# Patient Record
Sex: FEMALE | Race: WHITE | NOT HISPANIC OR LATINO | Employment: UNEMPLOYED | URBAN - METROPOLITAN AREA
[De-identification: names, ages, dates, MRNs, and addresses within clinical notes are randomized per-mention and may not be internally consistent; named-entity substitution may affect disease eponyms.]

---

## 2019-03-25 LAB — MAMMOGRAM: NORMAL

## 2019-05-07 ENCOUNTER — TRANSFERRED RECORDS (OUTPATIENT)
Dept: HEALTH INFORMATION MANAGEMENT | Facility: CLINIC | Age: 59
End: 2019-05-07

## 2020-08-13 ENCOUNTER — TRANSFERRED RECORDS (OUTPATIENT)
Dept: HEALTH INFORMATION MANAGEMENT | Facility: CLINIC | Age: 60
End: 2020-08-13

## 2020-08-25 ENCOUNTER — MEDICAL CORRESPONDENCE (OUTPATIENT)
Dept: HEALTH INFORMATION MANAGEMENT | Facility: CLINIC | Age: 60
End: 2020-08-25

## 2020-10-10 NOTE — TELEPHONE ENCOUNTER
FUTURE VISIT INFORMATION      FUTURE VISIT INFORMATION:    Date: 10.28.20    Time: 10:30    Location: [facility]  REFERRAL INFORMATION:    Referring provider:  MARITZA Courtney    Referring providers clinic:  [facility]    Reason for visit/diagnosis  new Pt referred by Dr Louisa Charles for alopecia.  Referral is in transit.  requested RECs be faxed to [facility].    RECORDS REQUESTED FROM:       Clinic name Comments Records Status Imaging Status   [facility] 9.22.20, 8.13.20, 11.5.19  Louisa Charles [facility] N/A

## 2020-10-15 ENCOUNTER — DOCUMENTATION ONLY (OUTPATIENT)
Dept: CARE COORDINATION | Facility: CLINIC | Age: 60
End: 2020-10-15

## 2020-10-28 ENCOUNTER — PRE VISIT (OUTPATIENT)
Dept: DERMATOLOGY | Facility: CLINIC | Age: 60
End: 2020-10-28

## 2020-10-28 ENCOUNTER — OFFICE VISIT (OUTPATIENT)
Dept: DERMATOLOGY | Facility: CLINIC | Age: 60
End: 2020-10-28
Payer: COMMERCIAL

## 2020-10-28 DIAGNOSIS — L66.12 FRONTAL FIBROSING ALOPECIA: Primary | ICD-10-CM

## 2020-10-28 PROCEDURE — 11901 INJECT SKIN LESIONS >7: CPT | Performed by: DERMATOLOGY

## 2020-10-28 PROCEDURE — 99202 OFFICE O/P NEW SF 15 MIN: CPT | Performed by: DERMATOLOGY

## 2020-10-28 RX ORDER — FLUTICASONE PROPIONATE 50 MCG
1-2 SPRAY, SUSPENSION (ML) NASAL
COMMUNITY

## 2020-10-28 RX ORDER — AMOXICILLIN 250 MG
1-2 CAPSULE ORAL
COMMUNITY
Start: 2019-10-03 | End: 2021-02-03

## 2020-10-28 RX ORDER — GABAPENTIN 100 MG/1
CAPSULE ORAL
COMMUNITY
Start: 2020-07-03

## 2020-10-28 RX ORDER — PYRIDOXINE HCL (VITAMIN B6) 100 MG
500 TABLET ORAL
COMMUNITY
Start: 2019-03-01

## 2020-10-28 RX ORDER — POLYETHYLENE GLYCOL 3350 17 G/17G
POWDER, FOR SOLUTION ORAL
COMMUNITY
Start: 2019-10-03

## 2020-10-28 RX ORDER — CLOBETASOL PROPIONATE 0.5 MG/ML
SOLUTION TOPICAL
COMMUNITY
Start: 2020-08-13

## 2020-10-28 RX ORDER — DOXYCYCLINE 100 MG/1
100 CAPSULE ORAL 2 TIMES DAILY
Qty: 60 CAPSULE | Refills: 3 | Status: SHIPPED | OUTPATIENT
Start: 2020-10-28 | End: 2021-02-17

## 2020-10-28 RX ORDER — TOLTERODINE TARTRATE 1 MG/1
1 TABLET, EXTENDED RELEASE ORAL
COMMUNITY
Start: 2020-10-26

## 2020-10-28 RX ORDER — RISEDRONATE SODIUM 35 MG/1
35 TABLET, FILM COATED ORAL
COMMUNITY
Start: 2019-06-03

## 2020-10-28 ASSESSMENT — PAIN SCALES - GENERAL: PAINLEVEL: NO PAIN (0)

## 2020-10-28 NOTE — PATIENT INSTRUCTIONS
Frontal fibrosing alopecia  Autoimmune condition  Scarring hair loss - we try to prevent progression  Treatment can be tough, everyone responds differently.    Options:  - Topical or injection steroids  - Doxycycline (antibiotic)  - Hydroxychloroquine   - Finasteride (anti-male hormone)    Start doxycycline 100 mg twice toni. Side effects are stomach upset and increased sun sensitivity (higher chance of developing a sunburn while in the sun). Okay to take medication with food and please wear sunscreen.

## 2020-10-28 NOTE — PROGRESS NOTES
[facility] Dermatology Note      Dermatology Problem List:  1. Frontal fibrosing alopecia (clinically diagnosed).  - Current rx: ILK, clobetasol solution, started doxycycline 10/28/2020   - Future: hydroxychloroquine, finasteride    CC:   Chief Complaint   Patient presents with     Hair Loss     Mariana is here today to be seen for Alopecia- Referred by Louisa Charles.          Encounter Date: Oct 28, 2020    History of Present Illness:  Ms. Mariana Flor is a 60 year old female who presents as a referral from Louisa Charles for evaluation of hair loss.  Patient first noted that her eyebrows are becoming thin last fall.  She tried clobetasol solution without improvement.  Her care was then disrupted due to Covid and she did not return until the summer.  At that time, she noted decrease in hair loss diffusely but especially along the frontal hairline.  She has lost at least 50% of her density.  Eyebrows intact.  Has not had to shave armpits in years.  Decreased hair growth on arms and legs.  Her scalp is itchy but no burning or pain.  She uses head and shoulders shampoo.  She had ILK x1 in August without any improvement.    She has a history of pulmonary embolism on anticoagulation and neck/back issues. She gets headaches daily. No personal or family history of autoimmune disease.  Dad and brothers with thinning hair but mom and sisters with full hair.    She works as a banker.     Past Medical History:   There is no problem list on file for this patient.    Past Medical History:   Diagnosis Date     History of pulmonary embolism      History reviewed. No pertinent surgical history.    Social History:  Patient reports that she has never smoked. She has never used smokeless tobacco.   Lives in [place], [place].    Family History:  Family History   Problem Relation Age of Onset     Autoimmune Disease No family hx of        Medications:  Current Outpatient Medications   Medication Sig Dispense Refill     Black  Pepper-Turmeric 3-500 MG CAPS Take 1 capsule by mouth       clobetasol (TEMOVATE) 0.05 % external solution        Cranberry 500 MG CAPS Take 500 mg by mouth       doxycycline monohydrate (MONODOX) 100 MG capsule Take 1 capsule (100 mg) by mouth 2 times daily 60 capsule 3     fluticasone (FLONASE) 50 MCG/ACT nasal spray Spray 1-2 sprays in nostril       gabapentin (NEURONTIN) 100 MG capsule        polyethylene glycol (MIRALAX) 17 GM/Dose powder        RISEdronate (ACTONEL) 35 MG tablet Take 35 mg by mouth       rivaroxaban ANTICOAGULANT (XARELTO) 20 MG TABS tablet Take 20 mg by mouth       senna-docusate (SENOKOT-S/PERICOLACE) 8.6-50 MG tablet Take 1-2 tablets by mouth       tolterodine (DETROL) 1 MG tablet Take 1 mg by mouth       Allergies   Allergen Reactions     Latex Rash     Alendronic Acid Rash     Cepacol Maximum Strength Rash     Nickel Rash         Review of Systems:  -Constitutional: Otherwise feeling well today, in usual state of health.  -Skin: As above in HPI. No additional skin concerns.    Physical exam:  GEN: This is a well developed, well-nourished female in no acute distress, in a pleasant mood.    SKIN: Focused examination of the scalp and face was performed.  -Manriquez skin type: II  -frontal recession of hairline with scattered lone fibers. Perifollicular erythema present, minimal scale.  -few fibers along eyebrows.  -eyelashes normal density.  -No other lesions of concern on areas examined.     Labs:  N/a    Impression/Plan:  1. Frontal fibrosing alopecia (based on clinical impression). Natural history and etiology discussed. Offered biopsy to confirm but given exam which is fairly classic, did not feel entirely necessary today. Counseled on scarring nature of this condition and goal of stopping progression. Discussed treatment options including topical steroids (which she has already failed), ILK (which she has had x1 without improvement), and systemics such as doxycycline,  hydroxychloroquine, and finasteride. Today we will plan to resume ILK and give longer trial more in the 3 month range and also start trial of doxycycline. Pending response, consider hydroxychloroquine or finasteride. She will plan to continue seeing Louisa Charles for ILK as is it much more convenient for her and plan to follow-up with us in about 3 months again, sooner if concerns.   - Kenalog intralesional injection procedure note: After verbal consent and discussion of risks including but not limited to atrophy, pain, and bruising, time out was performed, the patient underwent positioning and the area was prepped with isopropyl alcohol, 0.7 total cc of Kenalog 10 mg/cc was injected into approximately 7 site(s) on the scalp. The patient tolerated the procedure well and left the Dermatology clinic in good condition.  - Start doxycycline 100 mg BID. Counseled on side effects of GI upset and sun sensitivity.  - Future: hydroxychloroquine, finasteride    Follow-up in 3 months, earlier for new or changing lesions.       Staff Involved:  Staff Only    Neli Hazel MD    Department of Dermatology  [facility]: Phone: [phone], Fax: [phone]  10/28/2020

## 2020-10-28 NOTE — LETTER
Date:October 30, 2020      Patient was self referred, no letter generated. Do not send.        [facility] Health Information

## 2020-10-28 NOTE — NURSING NOTE
Dermatology Rooming Note    Mariana Flor's goals for this visit include:   Chief Complaint   Patient presents with     Hair Loss     Mariana is here today to be seen for Alopecia- Referred by Louisa Charles.      AMY Veras

## 2020-10-28 NOTE — LETTER
10/28/2020       RE: Mariana Flor  [address] [address] [address]     Dear Colleague,    Thank you for referring your patient, Mariana Flor, to the [facility] [place] at [facility]. Please see a copy of my visit note below.    [facility] Dermatology Note      Dermatology Problem List:  1. Frontal fibrosing alopecia (clinically diagnosed).  - Current rx: ILK, clobetasol solution, started doxycycline 10/28/2020   - Future: hydroxychloroquine, finasteride    CC:   Chief Complaint   Patient presents with     Hair Loss     Mariana is here today to be seen for Alopecia- Referred by Louisa Charles.          Encounter Date: Oct 28, 2020    History of Present Illness:  Ms. Mariana Flor is a 60 year old female who presents as a referral from Louisa Charles for evaluation of hair loss.  Patient first noted that her eyebrows are becoming thin last fall.  She tried clobetasol solution without improvement.  Her care was then disrupted due to Covid and she did not return until the summer.  At that time, she noted decrease in hair loss diffusely but especially along the frontal hairline.  She has lost at least 50% of her density.  Eyebrows intact.  Has not had to shave armpits in years.  Decreased hair growth on arms and legs.  Her scalp is itchy but no burning or pain.  She uses head and shoulders shampoo.  She had ILK x1 in August without any improvement.    She has a history of pulmonary embolism on anticoagulation and neck/back issues. She gets headaches daily. No personal or family history of autoimmune disease.  Dad and brothers with thinning hair but mom and sisters with full hair.    She works as a banker.     Past Medical History:   There is no problem list on file for this patient.    Past Medical History:   Diagnosis Date     History of pulmonary embolism      History reviewed. No pertinent surgical history.    Social  History:  Patient reports that she has never smoked. She has never used smokeless tobacco.   Lives in [place], [place].    Family History:  Family History   Problem Relation Age of Onset     Autoimmune Disease No family hx of        Medications:  Current Outpatient Medications   Medication Sig Dispense Refill     Black Pepper-Turmeric 3-500 MG CAPS Take 1 capsule by mouth       clobetasol (TEMOVATE) 0.05 % external solution        Cranberry 500 MG CAPS Take 500 mg by mouth       doxycycline monohydrate (MONODOX) 100 MG capsule Take 1 capsule (100 mg) by mouth 2 times daily 60 capsule 3     fluticasone (FLONASE) 50 MCG/ACT nasal spray Spray 1-2 sprays in nostril       gabapentin (NEURONTIN) 100 MG capsule        polyethylene glycol (MIRALAX) 17 GM/Dose powder        RISEdronate (ACTONEL) 35 MG tablet Take 35 mg by mouth       rivaroxaban ANTICOAGULANT (XARELTO) 20 MG TABS tablet Take 20 mg by mouth       senna-docusate (SENOKOT-S/PERICOLACE) 8.6-50 MG tablet Take 1-2 tablets by mouth       tolterodine (DETROL) 1 MG tablet Take 1 mg by mouth       Allergies   Allergen Reactions     Latex Rash     Alendronic Acid Rash     Cepacol Maximum Strength Rash     Nickel Rash         Review of Systems:  -Constitutional: Otherwise feeling well today, in usual state of health.  -Skin: As above in HPI. No additional skin concerns.    Physical exam:  GEN: This is a well developed, well-nourished female in no acute distress, in a pleasant mood.    SKIN: Focused examination of the scalp and face was performed.  -Manriquez skin type: II  -frontal recession of hairline with scattered lone fibers. Perifollicular erythema present, minimal scale.  -few fibers along eyebrows.  -eyelashes normal density.  -No other lesions of concern on areas examined.     Labs:  N/a    Impression/Plan:  1. Frontal fibrosing alopecia (based on clinical impression). Natural history and etiology discussed. Offered biopsy to confirm but given exam which is fairly  classic, did not feel entirely necessary today. Counseled on scarring nature of this condition and goal of stopping progression. Discussed treatment options including topical steroids (which she has already failed), ILK (which she has had x1 without improvement), and systemics such as doxycycline, hydroxychloroquine, and finasteride. Today we will plan to resume ILK and give longer trial more in the 3 month range and also start trial of doxycycline. Pending response, consider hydroxychloroquine or finasteride. She will plan to continue seeing Louisa Charles for ILK as is it much more convenient for her and plan to follow-up with us in about 3 months again, sooner if concerns.   - Kenalog intralesional injection procedure note: After verbal consent and discussion of risks including but not limited to atrophy, pain, and bruising, time out was performed, the patient underwent positioning and the area was prepped with isopropyl alcohol, 0.7 total cc of Kenalog 10 mg/cc was injected into approximately 7 site(s) on the scalp. The patient tolerated the procedure well and left the Dermatology clinic in good condition.  - Start doxycycline 100 mg BID. Counseled on side effects of GI upset and sun sensitivity.  - Future: hydroxychloroquine, finasteride    Follow-up in 3 months, earlier for new or changing lesions.       Staff Involved:  Staff Only    Neli Hazel MD    Department of Dermatology  [facility]: Phone: [phone], Fax: [phone]  10/28/2020        Again, thank you for allowing me to participate in the care of your patient.      Sincerely,    Neli Hazel MD

## 2020-10-28 NOTE — NURSING NOTE
Drug Administration Record    Prior to injection, verified patient identity using patient's name and date of birth.  Due to injection administration, patient instructed to remain in clinic for 15 minutes  afterwards, and to report any adverse reaction to me immediately.    Drug Name: triamcinolone acetonide(kenalog)  Dose: 2mL of triamcinolone 10mg/mL, 20mg dose  Route administered: ID  NDC #: Kenalog-10 (4297-8350-00)  Amount of waste(mL):3  Reason for waste: Multi dose vial    LOT #: [id number]  SITE: body  : [employer]  EXPIRATION DATE: March 2022

## 2021-01-04 ENCOUNTER — HEALTH MAINTENANCE LETTER (OUTPATIENT)
Age: 61
End: 2021-01-04

## 2021-02-01 ENCOUNTER — VIRTUAL VISIT (OUTPATIENT)
Dept: DERMATOLOGY | Facility: CLINIC | Age: 61
End: 2021-02-01
Payer: COMMERCIAL

## 2021-02-01 DIAGNOSIS — L66.12 FRONTAL FIBROSING ALOPECIA: Primary | ICD-10-CM

## 2021-02-01 PROCEDURE — 99213 OFFICE O/P EST LOW 20 MIN: CPT | Mod: 95 | Performed by: PHYSICIAN ASSISTANT

## 2021-02-01 RX ORDER — TACROLIMUS 1 MG/G
OINTMENT TOPICAL 2 TIMES DAILY
Qty: 60 G | Refills: 3 | Status: SHIPPED | OUTPATIENT
Start: 2021-02-01

## 2021-02-01 ASSESSMENT — PAIN SCALES - GENERAL: PAINLEVEL: NO PAIN (0)

## 2021-02-01 NOTE — PATIENT INSTRUCTIONS
Continue doxycyline 100 mg twice daily.  Use clobetasol solution once daily on the scalp.  Start Protopic 0.1% ointment twice daily on the eyebrows.     Okay to continue kenalog injections, but avoid areas of atrophy.   Do not use Kenalog 10 mg/ml on the face, Okay to use 5 mg/ml or 2.5 mg/ml. Again avoid areas of atrophy.     -Avoid using any topical steroids (clobetasol) within a week of injections

## 2021-02-01 NOTE — LETTER
2/1/2021       RE: Mar[place]  [address] [address] [address]     Dear Colleague,    Thank you for referring your patient, Mariana Flor, to the [facility] [place] at [facility]. Please see a copy of my visit note below.    [facility] Dermatology Note  Encounter Date: Feb 1, 2021  Store-and-Forward and Video, Amwell video chat. Location of teledermatologist: [facility] [place]. Date of images: 02/01/21. Start time: 10:38. End time:10:50    Dermatology Problem List:  1. Frontal fibrosing alopecia (clinically diagnosed).  - Current rx: ILK, clobetasol solution, started doxycycline 10/28/2020   -Protopic eyebrows  - Future: hydroxychloroquine, finasteride  ____________________________________________    Assessment & Plan:     # Frontal fibrosis alopecia,  Continue doxycyline 100 mg twice daily. (reviewed possible side effects)  Use clobetasol solution once daily on the scalp.  Start Protopic 0.1% ointment twice daily on the eyebrows. (discussed possible irritation/ stinging)     Okay to continue kenalog injections, but avoid areas of atrophy.   Do not use Kenalog 10 mg/ml on the face, Okay to use 5 mg/ml or 2.5 mg/ml. Again avoid areas of atrophy.     -Avoid using any topical steroids (clobetasol) within a week of injections          Procedures Performed:    None    Follow-up: 3 month(s) virtually (video), or earlier for new or changing lesions    Staff:     All risks, benefits and alternatives were discussed with patient.  Patient is in agreement and understands the assessment and plan.  All questions were answered.  Return to Clinic in 3 months or sooner as needed.   Tabatha Hernandez PA-C   ____________________________________________    CC: Hair Loss (mariana is having this visit today for a follow up on hair loss)    HPI:  Ms. Mariana Flor is a(n) 60 year old female who presents today as a  return patient for follow up of frontal fibrosing alopecia. She was last seen by Dr Velvet Hazel in October 2020 when she had intralesional kenalog to a few areas on the scalp and was started on doxycycline 100 mg bid. She states since then her hair has not been falling as much. She is unsure if she has noticed any growth. She denies any symptoms to the scalp such as scalp pain, itching or burning. She has been seeing Louisa Charles for ILK on her scalp and eyebrows. She has not noticed any growth on her eyebrows but did notice an divot in the skin after the last set of injections. She applies clobetasol to her eyebrows.     The patient denies any significant or change in headaches. No GI upset or sun burn. No diarrhea or bloody stools.     Patient is otherwise feeling well, without additional concerns.        Labs:  NA    Physical Exam:  Vitals: There were no vitals taken for this visit.  SKIN: Teledermatology photos were reviewed; image quality and interpretability: Acceptable. Image date: 1/28/21  - There are prominent temporal veins. No obvious terminal eyebrow hairs. Normal appearing finger nail plates.   Recession of the frontal hair line.   - No other lesions of concern on areas examined.     Medications:  Current Outpatient Medications   Medication     Black Pepper-Turmeric 3-500 MG CAPS     clobetasol (TEMOVATE) 0.05 % external solution     Cranberry 500 MG CAPS     doxycycline monohydrate (MONODOX) 100 MG capsule     fluticasone (FLONASE) 50 MCG/ACT nasal spray     gabapentin (NEURONTIN) 100 MG capsule     polyethylene glycol (MIRALAX) 17 GM/Dose powder     RISEdronate (ACTONEL) 35 MG tablet     rivaroxaban ANTICOAGULANT (XARELTO) 20 MG TABS tablet     tolterodine (DETROL) 1 MG tablet     senna-docusate (SENOKOT-S/PERICOLACE) 8.6-50 MG tablet     Current Facility-Administered Medications   Medication     triamcinolone acetonide (KENALOG-10) injection 7 mg      Past Medical/Surgical History:   There  is no problem list on file for this patient.    Past Medical History:   Diagnosis Date     History of pulmonary embolism        CC Neli Hazel MD   DERMATOLOGY  [address] [place] [address] on close of this encounter.                                          Again, thank you for allowing me to participate in the care of your patient.      Sincerely,    Tabatha Hernandez PA-C

## 2021-02-01 NOTE — NURSING NOTE
Dermatology Rooming Note    Mariana Flor's goals for this visit include:   Chief Complaint   Patient presents with     Hair Loss     mariana is having this visit today for a follow up on hair loss     Kenyatta Kraus CMA on 2/1/2021 at 10:16 AM

## 2021-02-01 NOTE — PROGRESS NOTES
[facility] Dermatology Note  Encounter Date: Feb 1, 2021  Store-and-Forward and Video, Armando video chat. Location of teledermatologist: [facility] [place]. Date of images: 02/01/21. Start time: 10:38. End time:10:50    Dermatology Problem List:  1. Frontal fibrosing alopecia (clinically diagnosed).  - Current rx: ILK, clobetasol solution, started doxycycline 10/28/2020   -Protopic eyebrows  - Future: hydroxychloroquine, finasteride  ____________________________________________    Assessment & Plan:     # Frontal fibrosis alopecia,  Continue doxycyline 100 mg twice daily. (reviewed possible side effects)  Use clobetasol solution once daily on the scalp.  Start Protopic 0.1% ointment twice daily on the eyebrows. (discussed possible irritation/ stinging)     Okay to continue kenalog injections, but avoid areas of atrophy.   Do not use Kenalog 10 mg/ml on the face, Okay to use 5 mg/ml or 2.5 mg/ml. Again avoid areas of atrophy.     -Avoid using any topical steroids (clobetasol) within a week of injections          Procedures Performed:    None    Follow-up: 3 month(s) virtually (video), or earlier for new or changing lesions    Staff:     All risks, benefits and alternatives were discussed with patient.  Patient is in agreement and understands the assessment and plan.  All questions were answered.  Return to Clinic in 3 months or sooner as needed.   Tabatha Hernandez PA-C   ____________________________________________    CC: Hair Loss (mariana is having this visit today for a follow up on hair loss)    HPI:  Ms. Mariana Flor is a(n) 60 year old female who presents today as a return patient for follow up of frontal fibrosing alopecia. She was last seen by Dr Velvet Hazel in October 2020 when she had intralesional kenalog to a few areas on the scalp and was started on doxycycline 100 mg bid. She states since then her hair has not been falling as much. She is unsure if  she has noticed any growth. She denies any symptoms to the scalp such as scalp pain, itching or burning. She has been seeing Louisa Charles for ILK on her scalp and eyebrows. She has not noticed any growth on her eyebrows but did notice an divot in the skin after the last set of injections. She applies clobetasol to her eyebrows.     The patient denies any significant or change in headaches. No GI upset or sun burn. No diarrhea or bloody stools.     Patient is otherwise feeling well, without additional concerns.        Labs:  NA    Physical Exam:  Vitals: There were no vitals taken for this visit.  SKIN: Teledermatology photos were reviewed; image quality and interpretability: Acceptable. Image date: 1/28/21  - There are prominent temporal veins. No obvious terminal eyebrow hairs. Normal appearing finger nail plates.   Recession of the frontal hair line.   - No other lesions of concern on areas examined.     Medications:  Current Outpatient Medications   Medication     Black Pepper-Turmeric 3-500 MG CAPS     clobetasol (TEMOVATE) 0.05 % external solution     Cranberry 500 MG CAPS     doxycycline monohydrate (MONODOX) 100 MG capsule     fluticasone (FLONASE) 50 MCG/ACT nasal spray     gabapentin (NEURONTIN) 100 MG capsule     polyethylene glycol (MIRALAX) 17 GM/Dose powder     RISEdronate (ACTONEL) 35 MG tablet     rivaroxaban ANTICOAGULANT (XARELTO) 20 MG TABS tablet     tolterodine (DETROL) 1 MG tablet     senna-docusate (SENOKOT-S/PERICOLACE) 8.6-50 MG tablet     Current Facility-Administered Medications   Medication     triamcinolone acetonide (KENALOG-10) injection 7 mg      Past Medical/Surgical History:   There is no problem list on file for this patient.    Past Medical History:   Diagnosis Date     History of pulmonary embolism        CC Neli Hazel MD   DERMATOLOGY  [address] [facility] [address] [address] [address] on close of this encounter.

## 2021-02-16 DIAGNOSIS — L66.12 FRONTAL FIBROSING ALOPECIA: ICD-10-CM

## 2021-02-17 RX ORDER — DOXYCYCLINE 100 MG/1
100 CAPSULE ORAL 2 TIMES DAILY
Qty: 60 CAPSULE | Refills: 2 | Status: SHIPPED | OUTPATIENT
Start: 2021-02-17 | End: 2021-05-07

## 2021-02-17 NOTE — TELEPHONE ENCOUNTER
doxycycline monohydrate (MONODOX) 100 MG capsule      Last Written Prescription Date:  10-28-20  Last Fill Quantity: 60,   # refills: 3  Last Office Visit : 2-1-21  Future Office visit:  5-7-21    Routing refill request to provider for review/approval because:  Associated diagnosis not on protocol     Frontal fibrosing alopecia

## 2021-02-17 NOTE — TELEPHONE ENCOUNTER
Received refill request for doxycycline as MINGO. Last dermatology clinic note reviewed. Plan was to continue this medication per last clinic note. Limited refill provided to bridge to next clinic visit.     Next clinic visit scheduled for 5/7/21.    Tabatha Hernandez PA-C CC'd as JOSHUA.     Louisa Son MD  Dermatology Resident

## 2021-05-07 ENCOUNTER — VIRTUAL VISIT (OUTPATIENT)
Dept: DERMATOLOGY | Facility: CLINIC | Age: 61
End: 2021-05-07
Payer: COMMERCIAL

## 2021-05-07 DIAGNOSIS — L66.12 FRONTAL FIBROSING ALOPECIA: Primary | ICD-10-CM

## 2021-05-07 DIAGNOSIS — Z51.81 MEDICATION MONITORING ENCOUNTER: ICD-10-CM

## 2021-05-07 PROCEDURE — 99214 OFFICE O/P EST MOD 30 MIN: CPT | Mod: 95 | Performed by: DERMATOLOGY

## 2021-05-07 RX ORDER — HYDROXYCHLOROQUINE SULFATE 200 MG/1
TABLET, FILM COATED ORAL
Qty: 90 TABLET | Refills: 2 | Status: SHIPPED | OUTPATIENT
Start: 2021-05-07

## 2021-05-07 ASSESSMENT — PAIN SCALES - GENERAL: PAINLEVEL: NO PAIN (0)

## 2021-05-07 NOTE — PROGRESS NOTES
[facility] Dermatology Note  Encounter Date: May 7, 2021  Store-and-Forward and Telephone ([phone] ). Location of teledermatologist: [facility] [place].  Start time: 1:01 pm. End time: 1:19 PM.    Dermatology Problem List:  1. Frontal fibrosing alopecia (clinically diagnosed).  - Current rx: ILK, clobetasol solution, Protopic to eyebrows  - Prior rx: doxycycline (10/28/2020-May 2021)  - Future: hydroxychloroquine, finasteride    ___________________________________________    Assessment & Plan:     # Frontal fibrosing alopecia, not improving on current regimen. Discussed alternative therapies including hydroxychloroquine and finasteride, she is interested in hydroxychloroquine.  - patient weights 58.9 kg --> at 5 mg/kg dosing, dose should be 294 mg daily --> will do hydroxychloroquine 400 mg daily a/w 200 mg daily dosing  - stop doxycycline as not effective  - continue clobetasol solution daily prn on the scalp  - continue Protopic BID prn eyebrows  - okay to continue ILK if desired   - also provided information on minoxidil 5% foam  After visit, patient sent [email or web address] message that she does not wish to start hydroxychloroquine. Advised to notify me if interested in it at any time, or re-discussion of other options.    # Lab monitoring for Plaquenil:  - needs baseline eye exam  - Hepatic panel wnl 12/16/2020  - CBC wnl 4/8/2021    Procedures Performed:    None    Follow-up: 3 months, sooner if concerns.     Staff:     Neli Hazel MD    Department of Dermatology  [facility]: Phone: [phone], Fax: [phone]  5/7/2021    ____________________________________________    CC: Hair Loss (adryan is having this visit today for a follow up on hair loss)    HPI:  Ms. Adryan Flor is a(n) 61 year old female who presents today as a return patient for FFA.    Not getting any  better.  Shedding all over, not just the front.  Really in the past month the shedding started.    Always had really bad headaches and pains.  Had a procedure on March 15th but the head pain has been for quite some time.  Was in the hospital in the fall with stomach problems.    She is itching more on scalp.  Still taking doxycycline 100 mg twice daily, almost out of it.  Using protopic for eyebrows, clobetasol along hairline.  Stopped injections about 2 months ago.  Hairline progressing backwards.    Patient is otherwise feeling well, without additional skin concerns.    Labs Reviewed:  See a/p    Physical Exam:  Vitals: There were no vitals taken for this visit.  SKIN: Teledermatology photos were reviewed; image quality and interpretability:   acceptable. Image date: see chart.  - regression of frontal hairline, unable to see more detailed exam.  - No other lesions of concern on areas examined.     Medications:  Current Outpatient Medications   Medication     Black Pepper-Turmeric 3-500 MG CAPS     clobetasol (TEMOVATE) 0.05 % external solution     Cranberry 500 MG CAPS     doxycycline monohydrate (MONODOX) 100 MG capsule     fluticasone (FLONASE) 50 MCG/ACT nasal spray     gabapentin (NEURONTIN) 100 MG capsule     polyethylene glycol (MIRALAX) 17 GM/Dose powder     RISEdronate (ACTONEL) 35 MG tablet     rivaroxaban ANTICOAGULANT (XARELTO) 20 MG TABS tablet     tacrolimus (PROTOPIC) 0.1 % external ointment     tolterodine (DETROL) 1 MG tablet     Current Facility-Administered Medications   Medication     triamcinolone acetonide (KENALOG-10) injection 7 mg      Past Medical/Surgical History:   There is no problem list on file for this patient.    Past Medical History:   Diagnosis Date     History of pulmonary embolism        CC Referred Self, MD  No address on file on close of this encounter.

## 2021-05-07 NOTE — LETTER
5/7/2021       RE: Mariana Flor  [id number] [facility] [address]     Dear Colleague,    Thank you for referring your patient, Mariana Flor, to the [facility] [place] [facility]. Please see a copy of my visit note below.    [facility] Dermatology Note  Encounter Date: May 7, 2021  Store-and-Forward and Telephone ([phone] ). Location of teledermatologist: [facility] [place].  Start time: 1:01 pm. End time: 1:19 PM.    Dermatology Problem List:  1. Frontal fibrosing alopecia (clinically diagnosed).  - Current rx: ILK, clobetasol solution, Protopic to eyebrows  - Prior rx: doxycycline (10/28/2020-May 2021)  - Future: hydroxychloroquine, finasteride    ___________________________________________    Assessment & Plan:     # Frontal fibrosing alopecia, not improving on current regimen. Discussed alternative therapies including hydroxychloroquine and finasteride, she is interested in hydroxychloroquine.  - patient weights 58.9 kg --> at 5 mg/kg dosing, dose should be 294 mg daily --> will do hydroxychloroquine 400 mg daily a/w 200 mg daily dosing  - stop doxycycline as not effective  - continue clobetasol solution daily prn on the scalp  - continue Protopic BID prn eyebrows  - okay to continue ILK if desired   - also provided information on minoxidil 5% foam  After visit, patient sent [employer] message that she does not wish to start hydroxychloroquine. Advised to notify me if interested in it at any time, or re-discussion of other options.    # Lab monitoring for Plaquenil:  - needs baseline eye exam  - Hepatic panel wnl 12/16/2020  - CBC wnl 4/8/2021    Procedures Performed:    None    Follow-up: 3 months, sooner if concerns.     Staff:     Neli Hazel MD    Department of Dermatology  [facility]: Phone: [phone], Fax: [phone]  5/7/2021    ____________________________________________    CC: Hair Loss (mariana is having this visit today for a follow up on hair loss)    HPI:  Ms. Mariana Flor is a(n) 61 year old female who presents today as a return patient for FFA.    Not getting any better.  Shedding all over, not just the front.  Really in the past month the shedding started.    Always had really bad headaches and pains.  Had a procedure on March 15th but the head pain has been for quite some time.  Was in the hospital in the fall with stomach problems.    She is itching more on scalp.  Still taking doxycycline 100 mg twice daily, almost out of it.  Using protopic for eyebrows, clobetasol along hairline.  Stopped injections about 2 months ago.  Hairline progressing backwards.    Patient is otherwise feeling well, without additional skin concerns.    Labs Reviewed:  See a/p    Physical Exam:  Vitals: There were no vitals taken for this visit.  SKIN: Teledermatology photos were reviewed; image quality and interpretability:   acceptable. Image date: see chart.  - regression of frontal hairline, unable to see more detailed exam.  - No other lesions of concern on areas examined.     Medications:  Current Outpatient Medications   Medication     Black Pepper-Turmeric 3-500 MG CAPS     clobetasol (TEMOVATE) 0.05 % external solution     Cranberry 500 MG CAPS     doxycycline monohydrate (MONODOX) 100 MG capsule     fluticasone (FLONASE) 50 MCG/ACT nasal spray     gabapentin (NEURONTIN) 100 MG capsule     polyethylene glycol (MIRALAX) 17 GM/Dose powder     RISEdronate (ACTONEL) 35 MG tablet     rivaroxaban ANTICOAGULANT (XARELTO) 20 MG TABS tablet     tacrolimus (PROTOPIC) 0.1 % external ointment     tolterodine (DETROL) 1 MG tablet     Current Facility-Administered Medications   Medication     triamcinolone acetonide (KENALOG-10) injection 7 mg      Past Medical/Surgical History:   There is no  problem list on file for this patient.    Past Medical History:   Diagnosis Date     History of pulmonary embolism        CC Referred Self, MD  No address on file on close of this encounter.

## 2021-05-07 NOTE — NURSING NOTE
Dermatology Rooming Note    Mariana Flor's goals for this visit include:   Chief Complaint   Patient presents with     Hair Loss     mariana is having this visit today for a follow up on hair loss     Kenyatta Kraus CMA on 5/7/2021 at 12:56 PM

## 2021-05-07 NOTE — PATIENT INSTRUCTIONS
Consider plaquenil - 1 pill (200 mg daily) alternating 2 pills (400 mg) daily  Need baseline eye exam  See handout below          Topical Rogaine (Minoxidil) for Pattern Hair Loss      Minoxidil is an FDA approved over the counter topical for the treatment of hair loss and thinning hair in men and women.     Initially a 2% solution was available however this required application twice daily. A 5% solution is also now approved, only requiring application once per day.     Available Products:     Rogaine 5% solution: Packaged for men however can be used by men or women. Use dropper and apply directly to scalp at bedtime. This product can cause an allergy because of presence of propylene glycol. Stop this product if you develop a rash or itching and contact your physician.     Rogaine 5% foam: Packaged for men and women: Apply foam directly to the scalp once daily. This is less greasy compared to the solution. This formula is preferred for those who had a reaction to the solution product. If you develop rash or itching, stop the product and contact your physician.     What if I stop minoxidil topical?     After stopping minoxidil the hair will return to the usual pattern of thinning. Using the product 3-4 times per week is better than not using product at all.       Can I use generic minoxidil?     Yes, look for 5% minoxidil.     What are the side effects?    The most common side effect is rash or itching of the scalp. This can occur if a contact allergy develops with propylene glycol. A small group of patients noticed the appearance of facial hair if the product runs onto the face or with prolonged use.       Last updated: 6/26/2016

## 2021-10-11 ENCOUNTER — HEALTH MAINTENANCE LETTER (OUTPATIENT)
Age: 61
End: 2021-10-11

## 2022-01-30 ENCOUNTER — HEALTH MAINTENANCE LETTER (OUTPATIENT)
Age: 62
End: 2022-01-30

## 2022-09-24 ENCOUNTER — HEALTH MAINTENANCE LETTER (OUTPATIENT)
Age: 62
End: 2022-09-24

## 2023-05-08 ENCOUNTER — HEALTH MAINTENANCE LETTER (OUTPATIENT)
Age: 63
End: 2023-05-08